# Patient Record
Sex: FEMALE | Race: BLACK OR AFRICAN AMERICAN | Employment: OTHER | URBAN - METROPOLITAN AREA
[De-identification: names, ages, dates, MRNs, and addresses within clinical notes are randomized per-mention and may not be internally consistent; named-entity substitution may affect disease eponyms.]

---

## 2019-12-06 ENCOUNTER — OFFICE VISIT (OUTPATIENT)
Dept: ENDOCRINOLOGY | Age: 69
End: 2019-12-06

## 2019-12-06 VITALS
TEMPERATURE: 97.8 F | WEIGHT: 168 LBS | BODY MASS INDEX: 31.72 KG/M2 | HEIGHT: 61 IN | OXYGEN SATURATION: 98 % | SYSTOLIC BLOOD PRESSURE: 132 MMHG | RESPIRATION RATE: 16 BRPM | DIASTOLIC BLOOD PRESSURE: 72 MMHG | HEART RATE: 82 BPM

## 2019-12-06 DIAGNOSIS — E03.9 ACQUIRED HYPOTHYROIDISM: Primary | ICD-10-CM

## 2019-12-06 DIAGNOSIS — R00.2 PALPITATIONS: ICD-10-CM

## 2019-12-06 RX ORDER — INSULIN GLARGINE 100 [IU]/ML
52 INJECTION, SOLUTION SUBCUTANEOUS
Refills: 6 | COMMUNITY
Start: 2019-10-04

## 2019-12-06 RX ORDER — GABAPENTIN 100 MG/1
1 CAPSULE ORAL
Refills: 2 | COMMUNITY
Start: 2019-10-04

## 2019-12-06 RX ORDER — LEVOTHYROXINE SODIUM 137 UG/1
137 TABLET ORAL
Refills: 6 | COMMUNITY
Start: 2019-10-04 | End: 2019-12-06 | Stop reason: ALTCHOICE

## 2019-12-06 RX ORDER — INSULIN ASPART 100 [IU]/ML
18 INJECTION, SOLUTION INTRAVENOUS; SUBCUTANEOUS
Refills: 3 | COMMUNITY
Start: 2019-10-04

## 2019-12-06 RX ORDER — ERGOCALCIFEROL 1.25 MG/1
50000 CAPSULE ORAL
COMMUNITY

## 2019-12-06 RX ORDER — LISINOPRIL 5 MG/1
TABLET ORAL
Refills: 5 | COMMUNITY
Start: 2019-10-04

## 2019-12-06 RX ORDER — ATORVASTATIN CALCIUM 40 MG/1
40 TABLET, FILM COATED ORAL DAILY
COMMUNITY

## 2019-12-06 RX ORDER — OMEPRAZOLE 40 MG/1
40 CAPSULE, DELAYED RELEASE ORAL DAILY
COMMUNITY

## 2019-12-06 RX ORDER — LEVOTHYROXINE SODIUM 137 UG/1
137 TABLET ORAL
Qty: 90 TAB | Refills: 3 | Status: SHIPPED | OUTPATIENT
Start: 2019-12-06

## 2019-12-06 NOTE — PROGRESS NOTES
Pernell Roland MD         Patient Information  Date:12/8/2019  Name : Fernando Tsai 78 y.o.     YOB: 1950         Referred by: Self-referred        History of present illness    Fernando Tsai is a 78 y.o. female  here for evaluation of thyroid. in [address] [place] [address] she had thyroidectomy for symptomatic goiter, on levothyroxine. The dose was gradually increased since the beginning of the year to 175 mcg. Beginning of the year she was not taking the levothyroxine consistently and hence the labs were abnormal.  Since being on 175 mcg levothyroxine she noted racing of the heart, nervousness, dizziness and weight loss  She went to the ER, TSH was lower but not suppressed, the dose was decreased  The weight loss was also due to the lifestyle changes    No change in the size of the neck or neck pain. No dysphagia,dysphonia or dyspnea. No constipation or cold intolerance/heat intolerance  No history of known radiation exposure    Has type 2 diabetes mellitus and on the insulin  She lost weight due to lifestyle changes to help with the glycemic control    Wt Readings from Last 3 Encounters:   12/06/19 168 lb (76.2 kg)       Past Medical History:   Diagnosis Date    Diabetes ([facility] [address]     Goiter     Hyperlipidemia     Hypertension     Hypothyroid        Current Outpatient Medications   Medication Sig    lisinopril (PRINIVIL, ZESTRIL) 5 mg tablet     BASAGLAR KWIKPEN U-100 INSULIN 100 unit/mL (3 mL) inpn 52 Units by SubCUTAneous route nightly.  NOVOLOG FLEXPEN U-100 INSULIN 100 unit/mL (3 mL) inpn 18 Units by SubCUTAneous route Before breakfast, lunch, and dinner.  gabapentin (NEURONTIN) 100 mg capsule Take 1 Cap by mouth three (3) times daily as needed.  ergocalciferol (VITAMIN D2) 50,000 unit capsule Take 50,000 Units by mouth every seven (7) days.  omeprazole (PRILOSEC) 40 mg capsule Take 40 mg by mouth daily.     atorvastatin (LIPITOR) 40 mg tablet Take 40 mg by mouth daily.  SYNTHROID 137 mcg tablet Take 137 mcg by mouth Daily (before breakfast). BRAND MEDICALLY NECESSARY     No current facility-administered medications for this visit. Allergies   Allergen Reactions    Contrast Agent [Iodine] Swelling    Seafood Swelling     Mouth blistering         Review of Systems:  All 10 systems  reviewed and are negative other than mentioned in HPI    Physical Examination:  Blood pressure 132/72, pulse 82, temperature 97.8 °F (36.6 °C), temperature source Oral, resp. rate 16, height 5' 0.5\" (1.537 m), weight 168 lb (76.2 kg), SpO2 98 %. - General: pleasant, no distress, good eye contact  - HEENT: no exopthalmos, no periorbital edema, no scleral/conjunctival injection, EOMI, no lid lag or stare  - Neck: supple, no thyromegaly, no nodules,no lymphadenopathy  - Cardiovascular: regular, normal rate, normal S1 and S2,  - Respiratory: clear to auscultation bilaterally  - Gastrointestinal: soft, nontender, nondistended, BS +  - Musculoskeletal: no proximal muscle weakness in upper or lower extremities  - Integumentary: no tremors, no edema,  - Neurological:alert and oriented   - Psychiatric: normal mood and affect    Data Reviewed:     [] Reviewed labs      Assessment/Plan:     1. Acquired hypothyroidism    2. Palpitations        Primary hypothyroidism   Status post total thyroidectomy, discussed the importance of taking the medications, instructions discussed,. Dose was adjusted but patient was not taking the medications consistently, continue 137 mcg, reports fluctuating TSH, switch it to brand-name  Labs in 6 weeks  Free T4 was normal, TSH was low but not suppressed so it is hard to say the symptoms were all due to over replacement.   If she has persistent palpitations and dizziness even after her biochemical euthyroidism needs cardiac work-up including Holter monitor which was discussed with the patient    Palpitations    Type 2 diabetes mellitus: Managed by PCP  She is having hypoglycemia with weight loss  Advised to decrease Basaglar to 45 units  If she has still low blood glucose NovoLog to be decreased to 14 units before meals  Follow-up with PCP    Patient Instructions   Basaglar 45 units at night   If you have low sugars reduce Novolog to 14 units before meals             Follow-up and Dispositions    · Return in about 6 months (around 6/6/2020). Patient /caregiver verbalized understanding   Voice-recognition software was used to generate this report, which may result in some phonetic-based errors in the grammar and contents. Even though attempts were made to correct all the mistakes, some may have been missed and remained in the body of the report.

## 2019-12-06 NOTE — PROGRESS NOTES
Cuate Clay is a 78 y.o. female here for   Chief Complaint   Patient presents with    New Patient     referred for Thyroid       1. Have you been to the ER, urgent care clinic since your last visit? Hospitalized since your last visit? -n/a    2. Have you seen or consulted any other health care providers outside of the [address] [place] [address] since your last visit?   Include any pap smears or colon screening.-n/a

## 2019-12-06 NOTE — LETTER
12/8/19 Patient: Anna Doshi YOB: 1950 Date of Visit: 12/6/2019 MD SUNDEEP Topete Leann Barragan [address] [place] [address] VIA Facsimile: [phone] Dear Jagjit Singh MD, Thank you for referring Ms. Anna Doshi to [facility] for evaluation. My notes for this consultation are attached. If you have questions, please do not hesitate to call me. I look forward to following your patient along with you. Sincerely, Amol Newell MD

## 2020-06-25 ENCOUNTER — HOSPITAL ENCOUNTER (OUTPATIENT)
Dept: GENERAL RADIOLOGY | Age: 70
Discharge: HOME | End: 2020-06-25
Attending: PODIATRIST
Payer: MEDICARE

## 2020-06-25 DIAGNOSIS — R60.9 SWELLING: ICD-10-CM

## 2020-06-25 DIAGNOSIS — R52 PAIN: ICD-10-CM

## 2020-06-25 PROCEDURE — 73660 X-RAY EXAM OF TOE(S): CPT

## 2023-05-10 RX ORDER — LEVOTHYROXINE SODIUM 137 UG/1
TABLET ORAL
COMMUNITY
Start: 2019-12-06

## 2023-05-10 RX ORDER — INSULIN GLARGINE 100 [IU]/ML
INJECTION, SOLUTION SUBCUTANEOUS
COMMUNITY
Start: 2019-10-04

## 2023-05-10 RX ORDER — GABAPENTIN 100 MG/1
1 CAPSULE ORAL 3 TIMES DAILY PRN
COMMUNITY
Start: 2019-10-04

## 2023-05-10 RX ORDER — ATORVASTATIN CALCIUM 40 MG/1
40 TABLET, FILM COATED ORAL DAILY
COMMUNITY

## 2023-05-10 RX ORDER — INSULIN ASPART 100 [IU]/ML
INJECTION, SOLUTION INTRAVENOUS; SUBCUTANEOUS
COMMUNITY
Start: 2019-10-04

## 2023-05-10 RX ORDER — OMEPRAZOLE 40 MG/1
40 CAPSULE, DELAYED RELEASE ORAL DAILY
COMMUNITY

## 2023-05-10 RX ORDER — LISINOPRIL 5 MG/1
TABLET ORAL
COMMUNITY
Start: 2019-10-04

## 2023-05-10 RX ORDER — ERGOCALCIFEROL 1.25 MG/1
CAPSULE ORAL
COMMUNITY